# Patient Record
Sex: FEMALE | Race: BLACK OR AFRICAN AMERICAN | Employment: FULL TIME | URBAN - METROPOLITAN AREA
[De-identification: names, ages, dates, MRNs, and addresses within clinical notes are randomized per-mention and may not be internally consistent; named-entity substitution may affect disease eponyms.]

---

## 2018-01-04 ENCOUNTER — APPOINTMENT (OUTPATIENT)
Dept: GENERAL RADIOLOGY | Age: 23
End: 2018-01-04
Attending: EMERGENCY MEDICINE
Payer: COMMERCIAL

## 2018-01-04 ENCOUNTER — HOSPITAL ENCOUNTER (EMERGENCY)
Age: 23
Discharge: HOME | End: 2018-01-04
Attending: EMERGENCY MEDICINE
Payer: COMMERCIAL

## 2018-01-04 VITALS
RESPIRATION RATE: 17 BRPM | HEART RATE: 73 BPM | DIASTOLIC BLOOD PRESSURE: 67 MMHG | BODY MASS INDEX: 32.65 KG/M2 | WEIGHT: 196 LBS | HEIGHT: 65 IN | SYSTOLIC BLOOD PRESSURE: 121 MMHG | TEMPERATURE: 98.8 F | OXYGEN SATURATION: 99 %

## 2018-01-04 DIAGNOSIS — N39.0 URINARY TRACT INFECTION WITHOUT HEMATURIA, SITE UNSPECIFIED: ICD-10-CM

## 2018-01-04 DIAGNOSIS — M54.6 ACUTE RIGHT-SIDED THORACIC BACK PAIN: Primary | ICD-10-CM

## 2018-01-04 LAB
BACTERIA URNS QL MICRO: NORMAL /HPF
CASTS URNS QL MICRO: NORMAL /LPF
EPI CELLS #/AREA URNS HPF: NORMAL /HPF
HCG UR QL: NEGATIVE
RBC #/AREA URNS HPF: NORMAL /HPF
SERVICE CMNT-IMP: NORMAL
WBC URNS QL MICRO: NORMAL /HPF
WET PREP GENITAL: NORMAL
WET PREP GENITAL: NORMAL

## 2018-01-04 PROCEDURE — 87210 SMEAR WET MOUNT SALINE/INK: CPT | Performed by: EMERGENCY MEDICINE

## 2018-01-04 PROCEDURE — 81003 URINALYSIS AUTO W/O SCOPE: CPT | Performed by: EMERGENCY MEDICINE

## 2018-01-04 PROCEDURE — 99285 EMERGENCY DEPT VISIT HI MDM: CPT | Performed by: EMERGENCY MEDICINE

## 2018-01-04 PROCEDURE — 81025 URINE PREGNANCY TEST: CPT

## 2018-01-04 PROCEDURE — 93005 ELECTROCARDIOGRAM TRACING: CPT | Performed by: EMERGENCY MEDICINE

## 2018-01-04 PROCEDURE — 71046 X-RAY EXAM CHEST 2 VIEWS: CPT

## 2018-01-04 PROCEDURE — 81015 MICROSCOPIC EXAM OF URINE: CPT | Performed by: EMERGENCY MEDICINE

## 2018-01-04 PROCEDURE — 87491 CHLMYD TRACH DNA AMP PROBE: CPT | Performed by: EMERGENCY MEDICINE

## 2018-01-04 RX ORDER — CYCLOBENZAPRINE HCL 10 MG
10 TABLET ORAL
Qty: 9 TAB | Refills: 0 | Status: SHIPPED | OUTPATIENT
Start: 2018-01-04 | End: 2018-02-19

## 2018-01-04 RX ORDER — SULFAMETHOXAZOLE AND TRIMETHOPRIM 800; 160 MG/1; MG/1
1 TABLET ORAL 2 TIMES DAILY
Qty: 6 TAB | Refills: 0 | Status: SHIPPED | OUTPATIENT
Start: 2018-01-04 | End: 2018-01-07

## 2018-01-05 LAB
ATRIAL RATE: 133 BPM
CALCULATED P AXIS, ECG09: 38 DEGREES
CALCULATED R AXIS, ECG10: 40 DEGREES
CALCULATED T AXIS, ECG11: 40 DEGREES
DIAGNOSIS, 93000: NORMAL
Q-T INTERVAL, ECG07: 394 MS
QRS DURATION, ECG06: 90 MS
QTC CALCULATION (BEZET), ECG08: 403 MS
VENTRICULAR RATE, ECG03: 63 BPM

## 2018-01-05 NOTE — ED TRIAGE NOTES
Patient arrives by her self with CC of :    Sharp pain in her Right ribs with deep inhalation. This started last week. She was seen at [facility] care last week, diagnosed with possible muscle spasm, and was referred to [facility] pulmonary for an appointment for this upcoming Monday. Itchy vagina that is constant, as well as burning with urinating.  This started Monday

## 2018-01-05 NOTE — ED PROVIDER NOTES
HPI Comments: Patient is a 23-year-old female comes to the emergency department [facility] with several complaints. She states she's been having pain in her right mid back for about a week. She denies any injury. There is no numbness or weakness. She also reports some dysuria and vaginal itching for a few days. She denies any vaginal discharge or abnormal bleeding. Patient is a 23 y.o. female presenting with urinary pain and back pain. The history is provided by the patient. Urinary Pain    This is a new problem. The current episode started more than 2 days ago. The problem occurs every urination. The problem has not changed since onset. The quality of the pain is described as burning. There has been no fever. Associated symptoms include back pain. Pertinent negatives include no abdominal pain. Back Pain    This is a new problem. The current episode started more than 1 week ago. The problem has not changed since onset. The pain is associated with no known injury. The pain is present in the thoracic spine and right side. The quality of the pain is described as shooting. The pain does not radiate. The pain is mild. The symptoms are aggravated by bending and certain positions. Associated symptoms include dysuria. Pertinent negatives include no chest pain, no abdominal pain, no bladder incontinence, no pelvic pain and no paresthesias. She has tried NSAIDs for the symptoms. The treatment provided mild relief. Past Medical History:   Diagnosis Date    Asthma     Migraines     Neurological disorder     migraines    Staph skin infection        Past Surgical History:   Procedure Laterality Date    HX ADENOIDECTOMY           History reviewed. No pertinent family history. Social History     Social History    Marital status: SINGLE     Spouse name: N/A    Number of children: N/A    Years of education: N/A     Occupational History    Not on file.      Social History Main Topics    Smoking status: Never Smoker    Smokeless tobacco: Never Used    Alcohol use 1.2 oz/week     2 Glasses of wine per week      Comment: Socially    Drug use: No    Sexual activity: Not on file     Other Topics Concern    Not on file     Social History Narrative         ALLERGIES: Review of patient's allergies indicates no known allergies. Review of Systems   Constitutional: Negative. HENT: Negative. Eyes: Negative. Respiratory: Negative. Cardiovascular: Negative for chest pain. Gastrointestinal: Negative for abdominal pain. Endocrine: Negative. Genitourinary: Positive for dysuria. Negative for bladder incontinence, genital sores, menstrual problem and pelvic pain. Musculoskeletal: Positive for back pain. Skin: Negative. Neurological: Negative. Negative for paresthesias. Vitals:    01/04/18 2020   BP: 116/71   Pulse: 73   Resp: 16   Temp: 98.8 °F (37.1 °C)   SpO2: 99%   Weight: 88.9 kg (196 lb)   Height: 5' 5\" (1.651 m)            Physical Exam   Constitutional: She is oriented to person, place, and time. She appears well-developed and well-nourished. HENT:   Head: Normocephalic and atraumatic. Neck: Normal range of motion. Neck supple. Cardiovascular: Normal rate and regular rhythm. Pulmonary/Chest: Effort normal and breath sounds normal. She exhibits no tenderness. Abdominal: Soft. There is no tenderness. There is no rebound. Genitourinary: There is no tenderness or lesion on the right labia. There is no tenderness or lesion on the left labia. No erythema, tenderness or bleeding in the vagina. No vaginal discharge found. Musculoskeletal: She exhibits no edema. Tenderness in spasm to the right thoracic paraspinal musculature palpation of this area reproduces her back pain   Neurological: She is alert and oriented to person, place, and time. Skin: Skin is warm and dry. No rash noted. Nursing note and vitals reviewed.        MDM  Number of Diagnoses or Management Options  Acute right-sided thoracic back pain:   Urinary tract infection without hematuria, site unspecified:   Diagnosis management comments: Differential diagnosis includes arrhythmia, chest wall pain, rib pain, pneumothorax, UTI    EKG shows normal sinus rhythm at a rate of 63 with no acute ischemic changes    Chest x-ray is negative    pregnancy test is negative urinalysis is positive for mild UTI       Amount and/or Complexity of Data Reviewed  Clinical lab tests: ordered and reviewed  Independent visualization of images, tracings, or specimens: yes    Risk of Complications, Morbidity, and/or Mortality  Presenting problems: low  Diagnostic procedures: low  Management options: low      ED Course   Voice dictation software was used during the making of this note. This software is not perfect and grammatical and other typographical errors may be present. This note has been proofread, but may still contain errors. Mame Davey MD; 1/4/2018 @10:38 PM   ===================================================================    11:01 PM  As the nurse was discharging the patient she expressed concern over possible sexual transmitted infection due to unprotected intercourse last weekend. I went back and reexamined the patient and performed a pelvic examination at her request.  Clinically on exam I see no sign of cervicitis or PID discussed this with the patient that the empiric test results would take 3 days. She opted to follow up on those results and we will treat the UTI and back spasm as previously discussed.     Procedures

## 2018-01-05 NOTE — ED NOTES
Assisted dr Cyndie Mata with pelvic exam. Pt tolerated well.  Wet prep and gc chlamydia collected by dr Cyndie Mata

## 2018-01-05 NOTE — DISCHARGE INSTRUCTIONS

## 2018-01-05 NOTE — ED NOTES
Pt reports left side chest pain, sharp, x1 week with right upper back pain with deep breathing x a few days, headache. Pt states back pain \"feels like a muscle spasm\". Also reports itching in vaginal area and burning with urination. Pt states she \"sees specks on toilet paper and then the inside lips itch and I feel bumps\". LMP Nov 25th. Negative pregnancy test at drs office and today in ER per patient. Pt states she just started birth control. nonlabored breathing. nontender abdomen. Breath sounds clear and equal bilaterally. Tenderness noted to upper right back. Non reproducible left chest pain. Pt states chest pain comes and goes randomly and pt has to stop and take very slow breaths until it goes away.

## 2018-01-08 LAB
C TRACH RRNA SPEC QL NAA+PROBE: NEGATIVE
N GONORRHOEA RRNA SPEC QL NAA+PROBE: NEGATIVE
SPECIMEN SOURCE: NORMAL

## 2018-02-19 ENCOUNTER — HOSPITAL ENCOUNTER (EMERGENCY)
Age: 23
Discharge: HOME | End: 2018-02-19
Attending: EMERGENCY MEDICINE
Payer: COMMERCIAL

## 2018-02-19 VITALS
TEMPERATURE: 99 F | HEART RATE: 80 BPM | DIASTOLIC BLOOD PRESSURE: 70 MMHG | SYSTOLIC BLOOD PRESSURE: 115 MMHG | RESPIRATION RATE: 15 BRPM | OXYGEN SATURATION: 99 %

## 2018-02-19 DIAGNOSIS — B34.9 VIRAL ILLNESS: Primary | ICD-10-CM

## 2018-02-19 DIAGNOSIS — R11.2 NON-INTRACTABLE VOMITING WITH NAUSEA, UNSPECIFIED VOMITING TYPE: ICD-10-CM

## 2018-02-19 LAB
ALBUMIN SERPL-MCNC: 4 G/DL (ref 3.5–5)
ALBUMIN/GLOB SERPL: 0.9 {RATIO} (ref 1.2–3.5)
ALP SERPL-CCNC: 48 U/L (ref 50–136)
ALT SERPL-CCNC: 41 U/L (ref 12–65)
ANION GAP SERPL CALC-SCNC: 7 MMOL/L (ref 7–16)
AST SERPL-CCNC: 43 U/L (ref 15–37)
BASOPHILS # BLD: 0 K/UL (ref 0–0.2)
BASOPHILS NFR BLD: 0 % (ref 0–2)
BILIRUB SERPL-MCNC: 0.3 MG/DL (ref 0.2–1.1)
BUN SERPL-MCNC: 13 MG/DL (ref 6–23)
CALCIUM SERPL-MCNC: 9.5 MG/DL (ref 8.3–10.4)
CHLORIDE SERPL-SCNC: 105 MMOL/L (ref 98–107)
CO2 SERPL-SCNC: 27 MMOL/L (ref 21–32)
CREAT SERPL-MCNC: 1.02 MG/DL (ref 0.6–1)
DIFFERENTIAL METHOD BLD: ABNORMAL
EOSINOPHIL # BLD: 0.1 K/UL (ref 0–0.8)
EOSINOPHIL NFR BLD: 1 % (ref 0.5–7.8)
ERYTHROCYTE [DISTWIDTH] IN BLOOD BY AUTOMATED COUNT: 14.3 % (ref 11.9–14.6)
FLUAV AG NPH QL IA: NEGATIVE
FLUBV AG NPH QL IA: NEGATIVE
GLOBULIN SER CALC-MCNC: 4.4 G/DL (ref 2.3–3.5)
GLUCOSE SERPL-MCNC: 105 MG/DL (ref 65–100)
HCT VFR BLD AUTO: 42.5 % (ref 35.8–46.3)
HGB BLD-MCNC: 13.5 G/DL (ref 11.7–15.4)
IMM GRANULOCYTES # BLD: 0 K/UL (ref 0–0.5)
IMM GRANULOCYTES NFR BLD AUTO: 0 % (ref 0–5)
LYMPHOCYTES # BLD: 3.5 K/UL (ref 0.5–4.6)
LYMPHOCYTES NFR BLD: 40 % (ref 13–44)
MCH RBC QN AUTO: 30 PG (ref 26.1–32.9)
MCHC RBC AUTO-ENTMCNC: 31.8 G/DL (ref 31.4–35)
MCV RBC AUTO: 94.4 FL (ref 79.6–97.8)
MONOCYTES # BLD: 0.4 K/UL (ref 0.1–1.3)
MONOCYTES NFR BLD: 5 % (ref 4–12)
NEUTS SEG # BLD: 4.7 K/UL (ref 1.7–8.2)
NEUTS SEG NFR BLD: 54 % (ref 43–78)
PLATELET # BLD AUTO: 344 K/UL (ref 150–450)
PMV BLD AUTO: 10.5 FL (ref 10.8–14.1)
POTASSIUM SERPL-SCNC: 3.6 MMOL/L (ref 3.5–5.1)
PROT SERPL-MCNC: 8.4 G/DL (ref 6.3–8.2)
RBC # BLD AUTO: 4.5 M/UL (ref 4.05–5.25)
SODIUM SERPL-SCNC: 139 MMOL/L (ref 136–145)
WBC # BLD AUTO: 8.7 K/UL (ref 4.3–11.1)

## 2018-02-19 PROCEDURE — 85025 COMPLETE CBC W/AUTO DIFF WBC: CPT | Performed by: EMERGENCY MEDICINE

## 2018-02-19 PROCEDURE — 96361 HYDRATE IV INFUSION ADD-ON: CPT | Performed by: EMERGENCY MEDICINE

## 2018-02-19 PROCEDURE — 96374 THER/PROPH/DIAG INJ IV PUSH: CPT | Performed by: EMERGENCY MEDICINE

## 2018-02-19 PROCEDURE — 80053 COMPREHEN METABOLIC PANEL: CPT | Performed by: EMERGENCY MEDICINE

## 2018-02-19 PROCEDURE — 87804 INFLUENZA ASSAY W/OPTIC: CPT | Performed by: EMERGENCY MEDICINE

## 2018-02-19 PROCEDURE — 74011250636 HC RX REV CODE- 250/636: Performed by: EMERGENCY MEDICINE

## 2018-02-19 PROCEDURE — 99283 EMERGENCY DEPT VISIT LOW MDM: CPT | Performed by: EMERGENCY MEDICINE

## 2018-02-19 RX ORDER — ONDANSETRON 4 MG/1
4 TABLET, ORALLY DISINTEGRATING ORAL
Qty: 20 TAB | Refills: 0 | Status: SHIPPED | OUTPATIENT
Start: 2018-02-19 | End: 2018-06-01

## 2018-02-19 RX ORDER — ONDANSETRON 2 MG/ML
4 INJECTION INTRAMUSCULAR; INTRAVENOUS
Status: COMPLETED | OUTPATIENT
Start: 2018-02-19 | End: 2018-02-19

## 2018-02-19 RX ADMIN — SODIUM CHLORIDE 1000 ML: 900 INJECTION, SOLUTION INTRAVENOUS at 22:11

## 2018-02-19 RX ADMIN — ONDANSETRON 4 MG: 2 INJECTION INTRAMUSCULAR; INTRAVENOUS at 22:11

## 2018-02-19 NOTE — LETTER
[facility] EMERGENCY DEPT 
[facility] [address] [place] [id number] 
[phone] Work/School Note Date: 2/19/2018 To Whom It May concern: 
 
Monica Lopez was seen and treated today in the emergency room by the following provider(s): 
Attending Provider: Yulissa Ceja MD. Monica Marroquin {Return to school/sport/work:2/21/2018 Sincerely, Adama Nick RN

## 2018-02-20 NOTE — ED PROVIDER NOTES
HPI Comments: Patient with nausea vomiting and diarrhea since Thursday. Mild diffuse abdominal cramping. No dysuria vaginal bleeding or discharge. Sick contacts at work. Patient is a 23 y.o. female presenting with abdominal pain. The history is provided by the patient. No  was used. Abdominal Pain    This is a new problem. The current episode started more than 2 days ago. The problem occurs constantly. The problem has been gradually worsening. The pain is associated with an unknown factor. The pain is located in the generalized abdominal region. The quality of the pain is cramping. The pain is mild. Associated symptoms include diarrhea, nausea and vomiting. Pertinent negatives include no fever, no melena, no constipation, no dysuria, no hematuria, no headaches, no chest pain and no back pain. Nothing worsens the pain. The pain is relieved by nothing. Past Medical History:   Diagnosis Date    Asthma     Migraines     Neurological disorder     migraines    Staph skin infection        Past Surgical History:   Procedure Laterality Date    HX ADENOIDECTOMY           History reviewed. No pertinent family history. Social History     Social History    Marital status: SINGLE     Spouse name: N/A    Number of children: N/A    Years of education: N/A     Occupational History    Not on file. Social History Main Topics    Smoking status: Never Smoker    Smokeless tobacco: Never Used    Alcohol use 1.2 oz/week     2 Glasses of wine per week      Comment: Socially    Drug use: No    Sexual activity: Not on file     Other Topics Concern    Not on file     Social History Narrative         ALLERGIES: Review of patient's allergies indicates no known allergies. Review of Systems   Constitutional: Negative for chills and fever. HENT: Negative for rhinorrhea and sore throat. Eyes: Negative for pain and redness.    Respiratory: Negative for chest tightness, shortness of breath and wheezing. Cardiovascular: Negative for chest pain and leg swelling. Gastrointestinal: Positive for abdominal pain, diarrhea, nausea and vomiting. Negative for constipation and melena. Genitourinary: Negative for dysuria and hematuria. Musculoskeletal: Negative for back pain, gait problem, neck pain and neck stiffness. Skin: Negative for color change and rash. Neurological: Negative for weakness, numbness and headaches. Vitals:    02/19/18 2003   BP: 113/69   Pulse: 84   Resp: 16   Temp: 99 °F (37.2 °C)   SpO2: 99%            Physical Exam   Constitutional: She is oriented to person, place, and time. She appears well-developed and well-nourished. HENT:   Head: Normocephalic and atraumatic. Neck: Normal range of motion. Neck supple. Cardiovascular: Normal rate and regular rhythm. No murmur heard. Pulmonary/Chest: Effort normal and breath sounds normal. She has no wheezes. Abdominal: Soft. Bowel sounds are normal. There is no tenderness. There is no rebound and no guarding. Musculoskeletal: Normal range of motion. She exhibits no edema. Neurological: She is alert and oriented to person, place, and time. Skin: Skin is warm and dry. Nursing note and vitals reviewed. MDM  Number of Diagnoses or Management Options  Diagnosis management comments: Viral illness with NV. Will treat at home.         Amount and/or Complexity of Data Reviewed  Clinical lab tests: ordered and reviewed  Tests in the medicine section of CPT®: ordered and reviewed    Patient Progress  Patient progress: stable        ED Course       Procedures      Results Include:    Recent Results (from the past 24 hour(s))   CBC WITH AUTOMATED DIFF    Collection Time: 02/19/18  8:40 PM   Result Value Ref Range    WBC 8.7 4.3 - 11.1 K/uL    RBC 4.50 4.05 - 5.25 M/uL    HGB 13.5 11.7 - 15.4 g/dL    HCT 42.5 35.8 - 46.3 %    MCV 94.4 79.6 - 97.8 FL    MCH 30.0 26.1 - 32.9 PG    MCHC 31.8 31.4 - 35.0 g/dL    RDW 14.3 11.9 - 14.6 %    PLATELET 312 284 - 336 K/uL    MPV 10.5 (L) 10.8 - 14.1 FL    DF AUTOMATED      NEUTROPHILS 54 43 - 78 %    LYMPHOCYTES 40 13 - 44 %    MONOCYTES 5 4.0 - 12.0 %    EOSINOPHILS 1 0.5 - 7.8 %    BASOPHILS 0 0.0 - 2.0 %    IMMATURE GRANULOCYTES 0 0.0 - 5.0 %    ABS. NEUTROPHILS 4.7 1.7 - 8.2 K/UL    ABS. LYMPHOCYTES 3.5 0.5 - 4.6 K/UL    ABS. MONOCYTES 0.4 0.1 - 1.3 K/UL    ABS. EOSINOPHILS 0.1 0.0 - 0.8 K/UL    ABS. BASOPHILS 0.0 0.0 - 0.2 K/UL    ABS. IMM. GRANS. 0.0 0.0 - 0.5 K/UL   METABOLIC PANEL, COMPREHENSIVE    Collection Time: 02/19/18  8:40 PM   Result Value Ref Range    Sodium 139 136 - 145 mmol/L    Potassium 3.6 3.5 - 5.1 mmol/L    Chloride 105 98 - 107 mmol/L    CO2 27 21 - 32 mmol/L    Anion gap 7 7 - 16 mmol/L    Glucose 105 (H) 65 - 100 mg/dL    BUN 13 6 - 23 MG/DL    Creatinine 1.02 (H) 0.6 - 1.0 MG/DL    GFR est AA >60 >60 ml/min/1.73m2    GFR est non-AA >60 >60 ml/min/1.73m2    Calcium 9.5 8.3 - 10.4 MG/DL    Bilirubin, total 0.3 0.2 - 1.1 MG/DL    ALT (SGPT) 41 12 - 65 U/L    AST (SGOT) 43 (H) 15 - 37 U/L    Alk.  phosphatase 48 (L) 50 - 136 U/L    Protein, total 8.4 (H) 6.3 - 8.2 g/dL    Albumin 4.0 3.5 - 5.0 g/dL    Globulin 4.4 (H) 2.3 - 3.5 g/dL    A-G Ratio 0.9 (L) 1.2 - 3.5     INFLUENZA A & B AG (RAPID TEST)    Collection Time: 02/19/18  9:59 PM   Result Value Ref Range    Influenza A Ag NEGATIVE  NEG      Influenza B Ag NEGATIVE  NEG

## 2018-02-20 NOTE — ED NOTES
I have reviewed discharge instructions with the patient. The patient verbalized understanding. Patient left ED via Discharge Method: ambulatory to Home with (insert name of family/friend, self, transport self). Opportunity for questions and clarification provided. Patient given 1 scripts. To continue your aftercare when you leave the hospital, you may receive an automated call from our care team to check in on how you are doing. This is a free service and part of our promise to provide the best care and service to meet your aftercare needs.  If you have questions, or wish to unsubscribe from this service please call [phone]. Thank you for Choosing our [facility] Emergency Department.

## 2018-06-01 ENCOUNTER — HOSPITAL ENCOUNTER (EMERGENCY)
Age: 23
Discharge: HOME | End: 2018-06-01
Attending: EMERGENCY MEDICINE
Payer: COMMERCIAL

## 2018-06-01 VITALS
SYSTOLIC BLOOD PRESSURE: 94 MMHG | BODY MASS INDEX: 32.65 KG/M2 | OXYGEN SATURATION: 100 % | HEIGHT: 65 IN | TEMPERATURE: 98.1 F | DIASTOLIC BLOOD PRESSURE: 52 MMHG | WEIGHT: 196 LBS | RESPIRATION RATE: 16 BRPM | HEART RATE: 80 BPM

## 2018-06-01 DIAGNOSIS — Z20.2 POSSIBLE EXPOSURE TO STD: Primary | ICD-10-CM

## 2018-06-01 LAB
HCG UR QL: NEGATIVE
SERVICE CMNT-IMP: NORMAL
WET PREP GENITAL: NORMAL
WET PREP GENITAL: NORMAL

## 2018-06-01 PROCEDURE — 74011250637 HC RX REV CODE- 250/637: Performed by: EMERGENCY MEDICINE

## 2018-06-01 PROCEDURE — 74011250636 HC RX REV CODE- 250/636: Performed by: EMERGENCY MEDICINE

## 2018-06-01 PROCEDURE — 87491 CHLMYD TRACH DNA AMP PROBE: CPT | Performed by: EMERGENCY MEDICINE

## 2018-06-01 PROCEDURE — 99284 EMERGENCY DEPT VISIT MOD MDM: CPT | Performed by: EMERGENCY MEDICINE

## 2018-06-01 PROCEDURE — 96372 THER/PROPH/DIAG INJ SC/IM: CPT | Performed by: EMERGENCY MEDICINE

## 2018-06-01 PROCEDURE — 81003 URINALYSIS AUTO W/O SCOPE: CPT | Performed by: EMERGENCY MEDICINE

## 2018-06-01 PROCEDURE — 74011000250 HC RX REV CODE- 250: Performed by: EMERGENCY MEDICINE

## 2018-06-01 PROCEDURE — 87210 SMEAR WET MOUNT SALINE/INK: CPT | Performed by: EMERGENCY MEDICINE

## 2018-06-01 PROCEDURE — 81025 URINE PREGNANCY TEST: CPT

## 2018-06-01 RX ORDER — AZITHROMYCIN 250 MG/1
1000 TABLET, FILM COATED ORAL
Status: COMPLETED | OUTPATIENT
Start: 2018-06-01 | End: 2018-06-01

## 2018-06-01 RX ADMIN — AZITHROMYCIN 1000 MG: 250 TABLET, FILM COATED ORAL at 12:09

## 2018-06-01 RX ADMIN — CEFTRIAXONE SODIUM 250 MG: 250 INJECTION, POWDER, FOR SOLUTION INTRAMUSCULAR; INTRAVENOUS at 12:09

## 2018-06-01 NOTE — ED TRIAGE NOTES
Patient reports having unprotected sex recently, had some vaginal discharge that was white without odor. Denies vaginal bleeding. Lower abdominal pain. Denies urinary sx.

## 2018-06-01 NOTE — ED NOTES
I have reviewed discharge instructions with the patient. The patient verbalized understanding. Patient left ED via Discharge Method: ambulatory to Home with (self). Opportunity for questions and clarification provided. Patient given 0 scripts. To continue your aftercare when you leave the hospital, you may receive an automated call from our care team to check in on how you are doing. This is a free service and part of our promise to provide the best care and service to meet your aftercare needs.  If you have questions, or wish to unsubscribe from this service please call [phone]. Thank you for Choosing our [facility] Emergency Department.

## 2018-06-01 NOTE — DISCHARGE INSTRUCTIONS
Learning How to Use a Female Condom  What is a female condom? Condoms can be used to prevent pregnancy or to help protect against sexually transmitted diseases. You must use a new condom each time you have intercourse. Condoms prevent pregnancy by keeping sperm and eggs apart. The condom holds the sperm so the sperm can't get into the vagina. A female condom is a tube of soft plastic with a closed end. Each end has a ring or rim. The ring at the closed end is inserted deep into the vagina over the cervix to hold the tube in place. The ring at the open end remains outside the opening of the vagina. How do you use a female condom? · Use a new condom each time you have intercourse. You can insert it up to 8 hours before intercourse. · These condoms have lubricant on the inside. Spread it by rubbing the sides of the condom together. You can also add lubricant. · Find a comfortable position to insert the condom. Some women stand with one foot on a chair. Other women sit on the edge of a chair or lie down. · Insert one finger into the condom. With your other hand, squeeze together the closed end of the condom and place that end into your vagina. Use the finger inside the condom to push the closed end as far into the vagina as it will go. · The open end of the condom will hang about an inch outside your vagina. · During intercourse, the penis should be inside the condom. · After ejaculation, remove the condom right away. ¨ Twist the open outside ring to close off the condom and hold the semen inside before the condom is removed. ¨ Pull the condom out before you stand up. How do you buy and store female condoms? · Female condoms may be available for free at family planning clinics. You can buy them without a prescription at [employer], online, and in some grocery stores. · Store the condoms at room temperature. Check the expiration date on the package before using one.   What else do you need to know?  · Don't use a female condom with a male condom. · If you think sperm may have leaked into the vagina during intercourse, you can use emergency contraception, such as the morning-after pill (Plan B). You can use emergency contraception for up to 5 days after having had sex, but it works best if you take it right away. · You may put spermicide or lubricant on the outside of the closed end of the condom. Where can you learn more? Go to http://josue-donna.info/. Enter V914 in the search box to learn more about \"Learning How to Use a Female Condom. \"  Current as of: March 16, 2017  Content Version: 11.4  © 2961-0740 Healthwise, [employer]. Care instructions adapted under license by [employer] (which disclaims liability or warranty for this information). If you have questions about a medical condition or this instruction, always ask your healthcare professional. [address] any warranty or liability for your use of this information.

## 2018-06-01 NOTE — ED PROVIDER NOTES
HPI Comments: 52-year-old female presents with exposure to STD. She had unprotected sex about 4 days ago. He came over today stating that he had some penile discharge. She reports some mild pelvic cramping. She had some clear discharge prior to the onset of her menstrual period last week which had resolved. This was prior to her sexual encounter. Otherwise has no symptoms. Has history of chlamydia. No vaginal bleeding or pain with urination. Patient is a 23 y.o. female presenting with pelvic pain. The history is provided by the patient. Pelvic Pain    Pertinent negatives include no fever, no vomiting and no dysuria. Past Medical History:   Diagnosis Date    Asthma     Migraines     Neurological disorder     migraines    Staph skin infection        Past Surgical History:   Procedure Laterality Date    HX ADENOIDECTOMY           History reviewed. No pertinent family history. Social History     Social History    Marital status: SINGLE     Spouse name: N/A    Number of children: N/A    Years of education: N/A     Occupational History    Not on file. Social History Main Topics    Smoking status: Never Smoker    Smokeless tobacco: Never Used    Alcohol use 1.2 oz/week     2 Glasses of wine per week      Comment: Socially    Drug use: No    Sexual activity: Not on file     Other Topics Concern    Not on file     Social History Narrative         ALLERGIES: Review of patient's allergies indicates no known allergies. Review of Systems   Constitutional: Negative for fever. Gastrointestinal: Negative for vomiting. Genitourinary: Positive for dyspareunia, pelvic pain and vaginal discharge. Negative for dysuria and vaginal bleeding. Vitals:    06/01/18 1057   BP: 119/73   Pulse: 65   Resp: 16   Temp: 98.1 °F (36.7 °C)   SpO2: 100%   Weight: 88.9 kg (196 lb)   Height: 5' 5\" (1.651 m)            Physical Exam   Constitutional: She appears well-developed and well-nourished. HENT:   Head: Normocephalic and atraumatic. Right Ear: External ear normal.   Left Ear: External ear normal.   Nose: Nose normal.   Mouth/Throat: Oropharynx is clear and moist.   Eyes: Conjunctivae are normal. Pupils are equal, round, and reactive to light. Neck: Normal range of motion. Neck supple. Cardiovascular: Regular rhythm, normal heart sounds and intact distal pulses. Pulmonary/Chest: Effort normal and breath sounds normal. No respiratory distress. She has no wheezes. Abdominal: Soft. Bowel sounds are normal. She exhibits no distension. There is tenderness in the suprapubic area and left lower quadrant. Genitourinary: Vagina normal. Cervix exhibits no motion tenderness, no discharge and no friability. Musculoskeletal: Normal range of motion. She exhibits no edema. Neurological: She is alert. Skin: Skin is warm and dry. Psychiatric: Judgment normal.   Nursing note and vitals reviewed. MDM  Number of Diagnoses or Management Options  Diagnosis management comments: Parts of this document were created using dragon voice recognition software. The chart has been reviewed but errors may still be present. Treated for possible gonorrhea and chlamydia. Wet prep unremarkable. I discussed the results of all labs, procedures, radiographs, and treatments with the patient and available family. Treatment plan is agreed upon and the patient is ready for discharge. Questions about treatment in the ED and differential diagnosis of presenting condition were answered. Patient was given verbal discharge instructions including, but not limited to, importance of returning to the emergency department for any concern of worsening or continued symptoms. Instructions were given to follow up with a primary care provider or specialist within 1-2 days.   Adverse effects of medications, if prescribed, were discussed and patient was advised to refrain from significant physical activity until followed up by primary care physician and to not drive or operate heavy machinery after taking any sedating substances.            Amount and/or Complexity of Data Reviewed  Clinical lab tests: ordered and reviewed (Results for orders placed or performed during the hospital encounter of 06/01/18  -WET PREP       Result                                            Value                         Ref Range                       Special Requests:                                 NO SPECIAL REQUESTS                                           Wet prep                                          WBC'S RARE                                                    Wet prep                                                                                                    NO YEAST,TRICHOMONAS OR CLUE CELLS NOTED  )  Tests in the medicine section of CPT®: ordered and reviewed          ED Course       Procedures

## 2018-06-01 NOTE — PROGRESS NOTES
Visited with patient as no PCP listed in chart. Patient states does not have a PCP. Discussed the importance of getting established with a PCP and provided patient with a list of PCPs. Encouraged patient to let me know if seh would like me to make the appointment or is in need of any assistance.

## 2019-05-06 ENCOUNTER — APPOINTMENT (OUTPATIENT)
Dept: GENERAL RADIOLOGY | Age: 24
End: 2019-05-06
Attending: EMERGENCY MEDICINE
Payer: SELF-PAY

## 2019-05-06 ENCOUNTER — HOSPITAL ENCOUNTER (EMERGENCY)
Age: 24
Discharge: HOME | End: 2019-05-06
Attending: EMERGENCY MEDICINE
Payer: SELF-PAY

## 2019-05-06 VITALS
BODY MASS INDEX: 32.06 KG/M2 | OXYGEN SATURATION: 99 % | WEIGHT: 199.5 LBS | HEART RATE: 68 BPM | TEMPERATURE: 98.4 F | SYSTOLIC BLOOD PRESSURE: 105 MMHG | DIASTOLIC BLOOD PRESSURE: 64 MMHG | HEIGHT: 66 IN | RESPIRATION RATE: 16 BRPM

## 2019-05-06 DIAGNOSIS — N30.01 ACUTE CYSTITIS WITH HEMATURIA: ICD-10-CM

## 2019-05-06 DIAGNOSIS — R07.89 CHEST WALL PAIN: Primary | ICD-10-CM

## 2019-05-06 LAB
BACTERIA URNS QL MICRO: ABNORMAL /HPF
CASTS URNS QL MICRO: 0 /LPF
CRYSTALS URNS QL MICRO: 0 /LPF
EPI CELLS #/AREA URNS HPF: ABNORMAL /HPF
HCG UR QL: NEGATIVE
MUCOUS THREADS URNS QL MICRO: 0 /LPF
OTHER OBSERVATIONS,UCOM: ABNORMAL
RBC #/AREA URNS HPF: ABNORMAL /HPF
WBC URNS QL MICRO: ABNORMAL /HPF

## 2019-05-06 PROCEDURE — 99284 EMERGENCY DEPT VISIT MOD MDM: CPT | Performed by: EMERGENCY MEDICINE

## 2019-05-06 PROCEDURE — 93005 ELECTROCARDIOGRAM TRACING: CPT | Performed by: EMERGENCY MEDICINE

## 2019-05-06 PROCEDURE — 81015 MICROSCOPIC EXAM OF URINE: CPT

## 2019-05-06 PROCEDURE — 71046 X-RAY EXAM CHEST 2 VIEWS: CPT

## 2019-05-06 PROCEDURE — 81025 URINE PREGNANCY TEST: CPT

## 2019-05-06 PROCEDURE — 74011250636 HC RX REV CODE- 250/636: Performed by: EMERGENCY MEDICINE

## 2019-05-06 PROCEDURE — 96372 THER/PROPH/DIAG INJ SC/IM: CPT | Performed by: EMERGENCY MEDICINE

## 2019-05-06 PROCEDURE — 81003 URINALYSIS AUTO W/O SCOPE: CPT | Performed by: EMERGENCY MEDICINE

## 2019-05-06 RX ORDER — NITROFURANTOIN 25; 75 MG/1; MG/1
100 CAPSULE ORAL 2 TIMES DAILY
Qty: 14 CAP | Refills: 0 | Status: SHIPPED | OUTPATIENT
Start: 2019-05-06 | End: 2019-05-13

## 2019-05-06 RX ORDER — KETOROLAC TROMETHAMINE 30 MG/ML
30 INJECTION, SOLUTION INTRAMUSCULAR; INTRAVENOUS
Status: COMPLETED | OUTPATIENT
Start: 2019-05-06 | End: 2019-05-06

## 2019-05-06 RX ADMIN — KETOROLAC TROMETHAMINE 30 MG: 30 INJECTION, SOLUTION INTRAMUSCULAR at 21:42

## 2019-05-06 NOTE — LETTER
[address] [facility] EMERGENCY DEPT 
[address] [place] [id number] 
[phone] Work/School Note Date: 5/6/2019 To Whom It May concern: 
 
Toribio Johnson was seen and treated today in the emergency room by the following provider(s): 
Attending Provider: Chino Boyd MD. Toribio Johnson may return to work on [id number]. Sincerely, Ralph Longoria RN

## 2019-05-07 LAB
ATRIAL RATE: 84 BPM
CALCULATED P AXIS, ECG09: 59 DEGREES
CALCULATED R AXIS, ECG10: 51 DEGREES
CALCULATED T AXIS, ECG11: 43 DEGREES
DIAGNOSIS, 93000: NORMAL
P-R INTERVAL, ECG05: 170 MS
Q-T INTERVAL, ECG07: 382 MS
QRS DURATION, ECG06: 88 MS
QTC CALCULATION (BEZET), ECG08: 451 MS
VENTRICULAR RATE, ECG03: 84 BPM

## 2019-05-07 NOTE — ED NOTES
I have reviewed discharge instructions with the patient. The patient verbalized understanding. Patient left ED via Discharge Method: ambulatory to Home with herself. Opportunity for questions and clarification provided. Patient given 1 scripts. To continue your aftercare when you leave the hospital, you may receive an automated call from our care team to check in on how you are doing. This is a free service and part of our promise to provide the best care and service to meet your aftercare needs.  If you have questions, or wish to unsubscribe from this service please call [phone]. Thank you for Choosing our [facility] Emergency Department.

## 2019-05-07 NOTE — DISCHARGE INSTRUCTIONS
Patient Education   Tylenol and ibuprofen for chest wall pain. Ice to the sore area for 15 minutes every 4 hours while awake for 3 days then change to heat for a few days. Antibiotic as prescribed for urinary tract infection. Follow up with her primary care doctor the doctor provided in 3-5 days if urinary symptoms not improving otherwise in 2 weeks of chest wall pain not improving. Return if any new, worsening or concerning symptoms. Musculoskeletal Chest Pain: Care Instructions  Your Care Instructions    Chest pain is not always a sign that something is wrong with your heart or that you have another serious problem. The doctor thinks your chest pain is caused by strained muscles or ligaments, inflamed chest cartilage, or another problem in your chest, rather than by your heart. You may need more tests to find the cause of your chest pain. Follow-up care is a key part of your treatment and safety. Be sure to make and go to all appointments, and call your doctor if you are having problems. It's also a good idea to know your test results and keep a list of the medicines you take. How can you care for yourself at home? · Take pain medicines exactly as directed. ? If the doctor gave you a prescription medicine for pain, take it as prescribed. ? If you are not taking a prescription pain medicine, ask your doctor if you can take an over-the-counter medicine. · Rest and protect the sore area. · Stop, change, or take a break from any activity that may be causing your pain or soreness. · Put ice or a cold pack on the sore area for 10 to 20 minutes at a time. Try to do this every 1 to 2 hours for the next 3 days (when you are awake) or until the swelling goes down. Put a thin cloth between the ice and your skin. · After 2 or 3 days, apply a heating pad set on low or a warm cloth to the area that hurts. Some doctors suggest that you go back and forth between hot and cold.   · Do not wrap or tape your ribs for support. This may cause you to take smaller breaths, which could increase your risk of lung problems. · Mentholated creams such as Bengay or Icy Hot may soothe sore muscles. Follow the instructions on the package. · Follow your doctor's instructions for exercising. · Gentle stretching and massage may help you get better faster. Stretch slowly to the point just before pain begins, and hold the stretch for at least 15 to 30 seconds. Do this 3 or 4 times a day. Stretch just after you have applied heat. · As your pain gets better, slowly return to your normal activities. Any increased pain may be a sign that you need to rest a while longer. When should you call for help? Call 911 anytime you think you may need emergency care. For example, call if:    · You have chest pain or pressure. This may occur with:  ? Sweating. ? Shortness of breath. ? Nausea or vomiting. ? Pain that spreads from the chest to the neck, jaw, or one or both shoulders or arms. ? Dizziness or lightheadedness. ? A fast or uneven pulse. After calling 911, chew 1 adult-strength aspirin. Wait for an ambulance. Do not try to drive yourself.     · You have sudden chest pain and shortness of breath, or you cough up blood.    Call your doctor now or seek immediate medical care if:    · You have any trouble breathing.     · Your chest pain gets worse.     · Your chest pain occurs consistently with exercise and is relieved by rest.    Watch closely for changes in your health, and be sure to contact your doctor if:    · Your chest pain does not get better after 1 week. Where can you learn more? Go to http://josue-donna.info/. Enter V293 in the search box to learn more about \"Musculoskeletal Chest Pain: Care Instructions. \"  Current as of: September 23, 2018  Content Version: 11.9  © 1403-5058 [employer]  Care instructions adapted under license by [employer] (which disclaims liability or warranty for this information). If you have questions about a medical condition or this instruction, always ask your healthcare professional. [address] any warranty or liability for your use of this information. Patient Education        Urinary Tract Infection in Women: Care Instructions  Your Care Instructions    A urinary tract infection, or UTI, is a general term for an infection anywhere between the kidneys and the urethra (where urine comes out). Most UTIs are bladder infections. They often cause pain or burning when you urinate. UTIs are caused by bacteria and can be cured with antibiotics. Be sure to complete your treatment so that the infection goes away. Follow-up care is a key part of your treatment and safety. Be sure to make and go to all appointments, and call your doctor if you are having problems. It's also a good idea to know your test results and keep a list of the medicines you take. How can you care for yourself at home? · Take your antibiotics as directed. Do not stop taking them just because you feel better. You need to take the full course of antibiotics. · Drink extra water and other fluids for the next day or two. This may help wash out the bacteria that are causing the infection. (If you have kidney, heart, or liver disease and have to limit fluids, talk with your doctor before you increase your fluid intake.)  · Avoid drinks that are carbonated or have caffeine. They can irritate the bladder. · Urinate often. Try to empty your bladder each time. · To relieve pain, take a hot bath or lay a heating pad set on low over your lower belly or genital area. Never go to sleep with a heating pad in place. To prevent UTIs  · Drink plenty of water each day. This helps you urinate often, which clears bacteria from your system.  (If you have kidney, heart, or liver disease and have to limit fluids, talk with your doctor before you increase your fluid intake.)  · Urinate when you need to. · Urinate right after you have sex. · Change sanitary pads often. · Avoid douches, bubble baths, feminine hygiene sprays, and other feminine hygiene products that have deodorants. · After going to the bathroom, wipe from front to back. When should you call for help? Call your doctor now or seek immediate medical care if:    · Symptoms such as fever, chills, nausea, or vomiting get worse or appear for the first time.     · You have new pain in your back just below your rib cage. This is called flank pain.     · There is new blood or pus in your urine.     · You have any problems with your antibiotic medicine.    Watch closely for changes in your health, and be sure to contact your doctor if:    · You are not getting better after taking an antibiotic for 2 days.     · Your symptoms go away but then come back. Where can you learn more? Go to http://josue-donna.info/. Enter N651 in the search box to learn more about \"Urinary Tract Infection in Women: Care Instructions. \"  Current as of: March 20, 2018  Content Version: 11.9  © 6837-8485 [employer]. Care instructions adapted under license by [employer] (which disclaims liability or warranty for this information). If you have questions about a medical condition or this instruction, always ask your healthcare professional. [address] any warranty or liability for your use of this information.

## 2019-05-07 NOTE — ED PROVIDER NOTES
52-year-old female with onset of left sided chest pain worse with breathing and movement this afternoon. Shortly after that she vomited several times. She denies any abdominal pain or urinary symptoms other than her urine being a bit darker. She denies fever. No cough cold or congestion. Patient has not taken birth control pills in 4-6 months. No cough or hemoptysis. No unilateral leg swelling. No previous history of PE or DVT. The history is provided by the patient. Vomiting This is a new problem. The current episode started 3 to 5 hours ago. Episode frequency: 3. The problem has been resolved. The emesis has an appearance of stomach contents. There has been no fever. Pertinent negatives include no chills, no fever, no abdominal pain, no diarrhea, no headaches, no myalgias, no cough, no URI and no headaches. The patient is not pregnant. Chest Pain (Angina) This is a new problem. The current episode started 3 to 5 hours ago. The problem has not changed since onset. Episode frequency: intermittently. The pain is associated with breathing and movement. The pain is present in the left side. The pain is moderate. The quality of the pain is described as sharp. The pain does not radiate. The symptoms are aggravated by movement, palpation, certain positions and deep breathing. Associated symptoms include vomiting. Pertinent negatives include no abdominal pain, no back pain, no cough, no fever, no headaches, no nausea, no numbness, no shortness of breath and no weakness. Past Medical History:  
Diagnosis Date  Asthma  Migraines  Neurological disorder   
 migraines  Staph skin infection Past Surgical History:  
Procedure Laterality Date  HX ADENOIDECTOMY History reviewed. No pertinent family history. Social History Socioeconomic History  Marital status: SINGLE Spouse name: Not on file  Number of children: Not on file  Years of education: Not on file  Highest education level: Not on file Occupational History  Not on file Social Needs  Financial resource strain: Not on file  Food insecurity:  
  Worry: Not on file Inability: Not on file  Transportation needs:  
  Medical: Not on file Non-medical: Not on file Tobacco Use  Smoking status: Never Smoker  Smokeless tobacco: Never Used Substance and Sexual Activity  Alcohol use: Yes Alcohol/week: 1.2 oz Types: 2 Glasses of wine per week Comment: Socially  Drug use: No  
 Sexual activity: Not on file Lifestyle  Physical activity:  
  Days per week: Not on file Minutes per session: Not on file  Stress: Not on file Relationships  Social connections:  
  Talks on phone: Not on file Gets together: Not on file Attends L[employer]-day service: Not on file Active member of club or organization: Not on file Attends meetings of clubs or organizations: Not on file Relationship status: Not on file  Intimate partner violence:  
  Fear of current or ex partner: Not on file Emotionally abused: Not on file Physically abused: Not on file Forced sexual activity: Not on file Other Topics Concern  Not on file Social History Narrative  Not on file ALLERGIES: Patient has no known allergies. Review of Systems Constitutional: Negative for chills and fever. HENT: Negative for congestion, rhinorrhea and sore throat. Eyes: Negative for photophobia and redness. Respiratory: Negative for cough and shortness of breath. Cardiovascular: Positive for chest pain. Negative for leg swelling. Gastrointestinal: Positive for vomiting. Negative for abdominal pain, diarrhea and nausea. Endocrine: Negative for polydipsia and polyuria. Genitourinary: Negative for dysuria and hematuria. Darker urine Musculoskeletal: Negative for back pain and myalgias. Neurological: Negative for weakness, numbness and headaches. Vitals:  
 05/06/19 1919 05/06/19 2033 BP: 107/69 Pulse: 76 Resp: 16 Temp: 98.5 °F (36.9 °C) SpO2: 98% 98% Weight: 90.5 kg (199 lb 8 oz) Height: 5' 5.5\" (1.664 m) Physical Exam  
Constitutional: She appears well-developed and well-nourished. No distress. HENT:  
Head: Normocephalic. Eyes: Pupils are equal, round, and reactive to light. Cardiovascular: Normal rate, regular rhythm and normal heart sounds. Pulses: 
     Carotid pulses are 2+ on the right side, and 2+ on the left side. Radial pulses are 2+ on the right side, and 2+ on the left side. Pulmonary/Chest: Effort normal and breath sounds normal. She exhibits tenderness (mild-to-moderateleft-sided chest wall tenderness in the anterolateral ribs underneath the left breast.). Abdominal: Soft. She exhibits no distension and no mass. There is no tenderness. There is no rebound and no guarding. Mild right sided thoracic paraspinous versus right flank tenderness. This is more chronic in nature per palpation. Musculoskeletal: Normal range of motion. Lymphadenopathy:  
  She has no cervical adenopathy. Neurological: She is alert. Skin: Skin is warm and dry. Nursing note and vitals reviewed. MDM Number of Diagnoses or Management Options Diagnosis management comments: Age > 48: NO 
HR >100: NO 
O2 Sat on Room Air < 95%:  NO 
Prior History of DVT/PE:NO Recent Trauma or Surgery: NO Hemoptysis: NO Exogenous Estrogen: NO 
Unilateral Leg Swelling: NO If all questions are answered no then pt is PERC negative and is very low risk for PE. No further testing, or imaging is indicated. Elias Scales MD 9:17 PM 5/6/2019 
=================================================================== Amount and/or Complexity of Data Reviewed Clinical lab tests: ordered and reviewed (Results for orders placed or performed during the hospital encounter of 05/06/19 
-URINE MICROSCOPIC Result                      Value             Ref Range WBC                         20-50             0 /hpf        
     RBC                         0-3               0 /hpf Epithelial cells            3-5               0 /hpf Bacteria                    4+ (H)            0 /hpf Casts                       0                 0 /lpf Crystals, urine             0                 0 /LPF Mucus                       0                 0 /lpf Other observations RESULTS VERIFIED MANUALLY 
-HCG URINE, QL. - POC Result                      Value             Ref Range Pregnancy test,urine (*     NEGATIVE          NEG           
-EKG, 12 LEAD, INITIAL Result                      Value             Ref Range Ventricular Rate            84                BPM           
     Atrial Rate                 84                BPM           
     P-R Interval                170               ms            
     QRS Duration                88                ms Q-T Interval                382               ms            
     QTC Calculation (Bezet)     451               ms            
     Calculated P Axis           59                degrees Calculated R Axis           51                degrees Calculated T Axis           43                degrees Diagnosis Normal sinus rhythm with sinus arrhythmia Normal ECG When compared with ECG of 04-JAN-2018 21:28, 
 Previous ECG has undetermined rhythm, needs review ) Tests in the radiology section of CPT®: ordered and reviewed (Xr Chest Pa Lat Result Date: 5/6/2019 TWO-VIEW CHEST: CLINICAL HISTORY: Left anterolateral pleuritic chest pain since this afternoon. COMPARISON:  January 4, 2018. FINDINGS: PA and lateral chest images demonstrate no acute pneumonic infiltrate or significant pleural fluid collection. The heart size is within normal limits without evidence of congestive heart failure or pneumothorax. The bony thorax appears intact on these views. There are overlying radiopaque support devices. IMPRESSION:  NO ACUTE CARDIOPULMONARY DISEASE IDENTIFIED. ) Procedures